# Patient Record
Sex: MALE | Race: WHITE | NOT HISPANIC OR LATINO
[De-identification: names, ages, dates, MRNs, and addresses within clinical notes are randomized per-mention and may not be internally consistent; named-entity substitution may affect disease eponyms.]

---

## 2022-05-23 ENCOUNTER — NON-APPOINTMENT (OUTPATIENT)
Age: 32
End: 2022-05-23

## 2022-05-26 ENCOUNTER — NON-APPOINTMENT (OUTPATIENT)
Age: 32
End: 2022-05-26

## 2022-05-26 ENCOUNTER — APPOINTMENT (OUTPATIENT)
Dept: FAMILY MEDICINE | Facility: CLINIC | Age: 32
End: 2022-05-26
Payer: MEDICAID

## 2022-05-26 VITALS
HEART RATE: 82 BPM | OXYGEN SATURATION: 98 % | BODY MASS INDEX: 29.19 KG/M2 | HEIGHT: 67 IN | DIASTOLIC BLOOD PRESSURE: 90 MMHG | WEIGHT: 186 LBS | RESPIRATION RATE: 14 BRPM | SYSTOLIC BLOOD PRESSURE: 120 MMHG

## 2022-05-26 VITALS — TEMPERATURE: 97.7 F

## 2022-05-26 DIAGNOSIS — R53.83 OTHER FATIGUE: ICD-10-CM

## 2022-05-26 DIAGNOSIS — Z76.89 PERSONS ENCOUNTERING HEALTH SERVICES IN OTHER SPECIFIED CIRCUMSTANCES: ICD-10-CM

## 2022-05-26 DIAGNOSIS — R35.0 FREQUENCY OF MICTURITION: ICD-10-CM

## 2022-05-26 DIAGNOSIS — Z78.9 OTHER SPECIFIED HEALTH STATUS: ICD-10-CM

## 2022-05-26 DIAGNOSIS — Z00.00 ENCOUNTER FOR GENERAL ADULT MEDICAL EXAMINATION W/OUT ABNORMAL FINDINGS: ICD-10-CM

## 2022-05-26 DIAGNOSIS — Z82.49 FAMILY HISTORY OF ISCHEMIC HEART DISEASE AND OTHER DISEASES OF THE CIRCULATORY SYSTEM: ICD-10-CM

## 2022-05-26 DIAGNOSIS — Z83.49 FAMILY HISTORY OF OTHER ENDOCRINE, NUTRITIONAL AND METABOLIC DISEASES: ICD-10-CM

## 2022-05-26 PROCEDURE — 99204 OFFICE O/P NEW MOD 45 MIN: CPT

## 2022-05-26 NOTE — HISTORY OF PRESENT ILLNESS
[FreeTextEntry8] : patient presents to establish care, c/o intermittent abdominal discomfort/pain, 3 out of 10, slight nausea \par \par Presenting in office to establish care, he has not primary care in 4 years. He has complaints of occasional abdominal discomfort associated with fatigue and muscle aches, and feels great post prandial fatigue. His complaints are general and have been going on for a couple of years. He has occasional frequent bowel movements, and frequenct with urination, occasionally once an hour throughout the day. He finds that he is very thirsty at time, and finds that he has not had much of an appetite recently especially in the morning. He has been trying to eat healthier lately, he used to eat a lot of fast food when he was younger. He was eating a lot more during the pandemic. He occasionally runs in the AM

## 2022-05-26 NOTE — PLAN
[FreeTextEntry1] : Presenting in office to establish care\par \par abdominal pain\par abdominal us ordered\par \par fatigue\par labs as listed above\par \par dsevere depression/anxiety\par will start lexapro 10mg\par RTO 2 weeks for follow up

## 2022-05-26 NOTE — HEALTH RISK ASSESSMENT
[3] : 2) Feeling down, depressed, or hopeless for nearly every day (3) [Nearly Every Day (3)] : 8.) Moving or speaking so slowly that other people could have noticed, or the opposite, moving or speaking faster than usual? Nearly every day [Severe] : severity of depression is severe [Extremely Difficult] : How difficult have these problems made it for you to do your work, take care of things at home, or get along with people? Extremely difficult [[id number]] : 6 [[id number]] : 25

## 2022-06-02 ENCOUNTER — OUTPATIENT (OUTPATIENT)
Dept: OUTPATIENT SERVICES | Facility: HOSPITAL | Age: 32
LOS: 1 days | End: 2022-06-02
Payer: MEDICAID

## 2022-06-02 ENCOUNTER — APPOINTMENT (OUTPATIENT)
Dept: ULTRASOUND IMAGING | Facility: CLINIC | Age: 32
End: 2022-06-02
Payer: MEDICAID

## 2022-06-02 ENCOUNTER — RESULT REVIEW (OUTPATIENT)
Age: 32
End: 2022-06-02

## 2022-06-02 DIAGNOSIS — R35.0 FREQUENCY OF MICTURITION: ICD-10-CM

## 2022-06-02 DIAGNOSIS — Z76.89 PERSONS ENCOUNTERING HEALTH SERVICES IN OTHER SPECIFIED CIRCUMSTANCES: ICD-10-CM

## 2022-06-02 DIAGNOSIS — R53.83 OTHER FATIGUE: ICD-10-CM

## 2022-06-02 DIAGNOSIS — Z00.00 ENCOUNTER FOR GENERAL ADULT MEDICAL EXAMINATION WITHOUT ABNORMAL FINDINGS: ICD-10-CM

## 2022-06-02 DIAGNOSIS — R11.0 NAUSEA: ICD-10-CM

## 2022-06-02 PROCEDURE — 76700 US EXAM ABDOM COMPLETE: CPT

## 2022-06-02 PROCEDURE — 76700 US EXAM ABDOM COMPLETE: CPT | Mod: 26

## 2022-07-14 ENCOUNTER — APPOINTMENT (OUTPATIENT)
Dept: FAMILY MEDICINE | Facility: CLINIC | Age: 32
End: 2022-07-14

## 2022-08-12 ENCOUNTER — RX RENEWAL (OUTPATIENT)
Age: 32
End: 2022-08-12

## 2022-08-26 ENCOUNTER — APPOINTMENT (OUTPATIENT)
Dept: FAMILY MEDICINE | Facility: CLINIC | Age: 32
End: 2022-08-26

## 2022-08-26 VITALS
RESPIRATION RATE: 14 BRPM | OXYGEN SATURATION: 97 % | TEMPERATURE: 97 F | HEIGHT: 67 IN | WEIGHT: 186 LBS | HEART RATE: 71 BPM | SYSTOLIC BLOOD PRESSURE: 126 MMHG | DIASTOLIC BLOOD PRESSURE: 80 MMHG | BODY MASS INDEX: 29.19 KG/M2

## 2022-08-26 DIAGNOSIS — R11.0 NAUSEA: ICD-10-CM

## 2022-08-26 DIAGNOSIS — R10.9 UNSPECIFIED ABDOMINAL PAIN: ICD-10-CM

## 2022-08-26 PROCEDURE — 99214 OFFICE O/P EST MOD 30 MIN: CPT

## 2022-08-26 NOTE — HISTORY OF PRESENT ILLNESS
[FreeTextEntry1] : patient presents for follow up on medication  [de-identified] : Noticed he was feeling a lot better but has run out in the last week or two, he started feeling a bit anxious and some tension. difficulty doing things, he tends to put off tasks until hours later. In addition he continues to have abdominal pain, describes them as a sharp, quick pain in the  RUQ And  LLQ that lasts for a few moments and then dissipates. Cannot pin point what makes pain worse or better but feels the pain a few times daily. Recently had abdominal us that showed hepatic steatosis.

## 2022-08-26 NOTE — PLAN
[FreeTextEntry1] : Depression/anxiety\par will increase dose of lexapro to 20mg\par RTO 1 month for follow up\par \par Abdominal pain\par reviewed abdominal us from 6/1/22 - hepatic steatosis\par will order ct of abdomen and pelvis to r/o other abdominal pathologies\par added mg and phosphorus\par \par elevated inflammatory markers labs reviewed 6/1/22\par will repeat sed and esr\par

## 2022-12-25 ENCOUNTER — RX RENEWAL (OUTPATIENT)
Age: 32
End: 2022-12-25

## 2023-02-27 ENCOUNTER — RX RENEWAL (OUTPATIENT)
Age: 33
End: 2023-02-27

## 2023-03-28 ENCOUNTER — RX RENEWAL (OUTPATIENT)
Age: 33
End: 2023-03-28

## 2023-04-26 ENCOUNTER — RX RENEWAL (OUTPATIENT)
Age: 33
End: 2023-04-26

## 2023-05-25 ENCOUNTER — RX RENEWAL (OUTPATIENT)
Age: 33
End: 2023-05-25

## 2023-06-26 ENCOUNTER — RX RENEWAL (OUTPATIENT)
Age: 33
End: 2023-06-26

## 2023-07-26 ENCOUNTER — RX RENEWAL (OUTPATIENT)
Age: 33
End: 2023-07-26

## 2023-08-14 ENCOUNTER — APPOINTMENT (OUTPATIENT)
Dept: FAMILY MEDICINE | Facility: CLINIC | Age: 33
End: 2023-08-14

## 2023-08-25 ENCOUNTER — TRANSCRIPTION ENCOUNTER (OUTPATIENT)
Age: 33
End: 2023-08-25

## 2023-08-25 ENCOUNTER — APPOINTMENT (OUTPATIENT)
Dept: FAMILY MEDICINE | Facility: CLINIC | Age: 33
End: 2023-08-25
Payer: COMMERCIAL

## 2023-08-25 VITALS
HEIGHT: 67 IN | BODY MASS INDEX: 29.19 KG/M2 | RESPIRATION RATE: 14 BRPM | WEIGHT: 186 LBS | DIASTOLIC BLOOD PRESSURE: 58 MMHG | OXYGEN SATURATION: 99 % | TEMPERATURE: 97.7 F | HEART RATE: 81 BPM | SYSTOLIC BLOOD PRESSURE: 118 MMHG

## 2023-08-25 PROCEDURE — 99214 OFFICE O/P EST MOD 30 MIN: CPT

## 2023-08-25 NOTE — HEALTH RISK ASSESSMENT
[0] : 1) Little interest or pleasure doing things: Not at all (0) [1] : 2) Feeling down, depressed, or hopeless for several days (1) [I have developed a follow-up plan documented below in the note.] : I have developed a follow-up plan documented below in the note. [[id number]] : 1

## 2023-08-25 NOTE — HISTORY OF PRESENT ILLNESS
[FreeTextEntry1] : 33 year old male present for his medication renewal visit. [de-identified] : PResenting in office for medication renewal, he feels that he may want to increase his dose. He is happy and content but occasionally feels social anxiety and had better control with this when he first started taking lexapro.

## 2023-08-25 NOTE — PLAN
[FreeTextEntry1] : Anxiety depression significantly improved from starting medication okay to add welbutrin he will RTO in 1 month PHQ 1 mild and well controlled

## 2023-08-26 ENCOUNTER — RX RENEWAL (OUTPATIENT)
Age: 33
End: 2023-08-26

## 2023-09-25 ENCOUNTER — RX RENEWAL (OUTPATIENT)
Age: 33
End: 2023-09-25

## 2023-10-05 ENCOUNTER — APPOINTMENT (OUTPATIENT)
Dept: FAMILY MEDICINE | Facility: CLINIC | Age: 33
End: 2023-10-05
Payer: COMMERCIAL

## 2023-10-05 VITALS
BODY MASS INDEX: 27.15 KG/M2 | HEIGHT: 67 IN | WEIGHT: 173 LBS | DIASTOLIC BLOOD PRESSURE: 76 MMHG | TEMPERATURE: 98.2 F | SYSTOLIC BLOOD PRESSURE: 116 MMHG | OXYGEN SATURATION: 98 % | HEART RATE: 72 BPM | RESPIRATION RATE: 15 BRPM

## 2023-10-05 DIAGNOSIS — F41.9 ANXIETY DISORDER, UNSPECIFIED: ICD-10-CM

## 2023-10-05 DIAGNOSIS — F32.A ANXIETY DISORDER, UNSPECIFIED: ICD-10-CM

## 2023-10-05 PROCEDURE — 99214 OFFICE O/P EST MOD 30 MIN: CPT

## 2023-10-23 ENCOUNTER — RX RENEWAL (OUTPATIENT)
Age: 33
End: 2023-10-23

## 2023-11-24 ENCOUNTER — RX RENEWAL (OUTPATIENT)
Age: 33
End: 2023-11-24

## 2023-11-24 RX ORDER — BUPROPION HYDROCHLORIDE 150 MG/1
150 TABLET, FILM COATED, EXTENDED RELEASE ORAL
Qty: 90 | Refills: 0 | Status: ACTIVE | COMMUNITY
Start: 2023-08-25 | End: 1900-01-01

## 2023-12-21 ENCOUNTER — RX RENEWAL (OUTPATIENT)
Age: 33
End: 2023-12-21

## 2024-01-18 ENCOUNTER — RX RENEWAL (OUTPATIENT)
Age: 34
End: 2024-01-18

## 2024-02-19 ENCOUNTER — RX RENEWAL (OUTPATIENT)
Age: 34
End: 2024-02-19

## 2024-03-25 ENCOUNTER — TRANSCRIPTION ENCOUNTER (OUTPATIENT)
Age: 34
End: 2024-03-25

## 2024-03-25 ENCOUNTER — RX RENEWAL (OUTPATIENT)
Age: 34
End: 2024-03-25

## 2024-04-18 ENCOUNTER — RX RENEWAL (OUTPATIENT)
Age: 34
End: 2024-04-18

## 2024-05-23 ENCOUNTER — RX RENEWAL (OUTPATIENT)
Age: 34
End: 2024-05-23

## 2024-06-22 ENCOUNTER — RX RENEWAL (OUTPATIENT)
Age: 34
End: 2024-06-22

## 2024-06-22 RX ORDER — ESCITALOPRAM OXALATE 20 MG/1
20 TABLET ORAL
Qty: 30 | Refills: 0 | Status: ACTIVE | COMMUNITY
Start: 2022-05-26 | End: 1900-01-01

## 2024-06-22 RX ORDER — BUPROPION HYDROCHLORIDE 300 MG/1
300 TABLET, EXTENDED RELEASE ORAL
Qty: 30 | Refills: 0 | Status: ACTIVE | COMMUNITY
Start: 2023-10-05 | End: 1900-01-01

## 2024-07-01 ENCOUNTER — APPOINTMENT (OUTPATIENT)
Dept: FAMILY MEDICINE | Facility: CLINIC | Age: 34
End: 2024-07-01

## 2024-07-22 ENCOUNTER — RX RENEWAL (OUTPATIENT)
Age: 34
End: 2024-07-22

## 2024-08-12 ENCOUNTER — RX RENEWAL (OUTPATIENT)
Age: 34
End: 2024-08-12

## 2024-08-22 ENCOUNTER — RX RENEWAL (OUTPATIENT)
Age: 34
End: 2024-08-22

## 2024-09-19 ENCOUNTER — APPOINTMENT (OUTPATIENT)
Dept: FAMILY MEDICINE | Facility: CLINIC | Age: 34
End: 2024-09-19

## 2024-09-23 ENCOUNTER — RX RENEWAL (OUTPATIENT)
Age: 34
End: 2024-09-23

## 2024-10-22 ENCOUNTER — RX RENEWAL (OUTPATIENT)
Age: 34
End: 2024-10-22

## 2024-10-28 ENCOUNTER — APPOINTMENT (OUTPATIENT)
Dept: FAMILY MEDICINE | Facility: CLINIC | Age: 34
End: 2024-10-28
Payer: COMMERCIAL

## 2024-10-28 VITALS
WEIGHT: 190 LBS | OXYGEN SATURATION: 97 % | BODY MASS INDEX: 29.82 KG/M2 | RESPIRATION RATE: 14 BRPM | HEIGHT: 67 IN | DIASTOLIC BLOOD PRESSURE: 80 MMHG | HEART RATE: 76 BPM | SYSTOLIC BLOOD PRESSURE: 130 MMHG

## 2024-10-28 DIAGNOSIS — J32.9 CHRONIC SINUSITIS, UNSPECIFIED: ICD-10-CM

## 2024-10-28 PROCEDURE — 99214 OFFICE O/P EST MOD 30 MIN: CPT

## 2024-10-28 PROCEDURE — G2211 COMPLEX E/M VISIT ADD ON: CPT | Mod: NC

## 2024-10-28 RX ORDER — AZITHROMYCIN 250 MG/1
250 TABLET, FILM COATED ORAL
Qty: 1 | Refills: 0 | Status: ACTIVE | COMMUNITY
Start: 2024-10-28 | End: 1900-01-01

## 2024-11-19 ENCOUNTER — RX RENEWAL (OUTPATIENT)
Age: 34
End: 2024-11-19

## 2024-12-19 ENCOUNTER — RX RENEWAL (OUTPATIENT)
Age: 34
End: 2024-12-19

## 2025-01-20 ENCOUNTER — TRANSCRIPTION ENCOUNTER (OUTPATIENT)
Age: 35
End: 2025-01-20

## 2025-01-20 ENCOUNTER — RX RENEWAL (OUTPATIENT)
Age: 35
End: 2025-01-20

## 2025-02-19 ENCOUNTER — RX RENEWAL (OUTPATIENT)
Age: 35
End: 2025-02-19

## 2025-03-04 ENCOUNTER — APPOINTMENT (OUTPATIENT)
Dept: FAMILY MEDICINE | Facility: CLINIC | Age: 35
End: 2025-03-04

## 2025-03-21 ENCOUNTER — RX RENEWAL (OUTPATIENT)
Age: 35
End: 2025-03-21

## 2025-04-24 ENCOUNTER — RX RENEWAL (OUTPATIENT)
Age: 35
End: 2025-04-24